# Patient Record
Sex: MALE | Race: WHITE | NOT HISPANIC OR LATINO | Employment: UNEMPLOYED | URBAN - NONMETROPOLITAN AREA
[De-identification: names, ages, dates, MRNs, and addresses within clinical notes are randomized per-mention and may not be internally consistent; named-entity substitution may affect disease eponyms.]

---

## 2020-07-10 ENCOUNTER — APPOINTMENT (OUTPATIENT)
Dept: CT IMAGING | Facility: HOSPITAL | Age: 35
End: 2020-07-10
Attending: EMERGENCY MEDICINE
Payer: COMMERCIAL

## 2020-07-10 ENCOUNTER — APPOINTMENT (OUTPATIENT)
Dept: GENERAL RADIOLOGY | Facility: HOSPITAL | Age: 35
End: 2020-07-10
Attending: EMERGENCY MEDICINE
Payer: COMMERCIAL

## 2020-07-10 ENCOUNTER — HOSPITAL ENCOUNTER (EMERGENCY)
Facility: HOSPITAL | Age: 35
Discharge: LEFT BEFORE DISCHARGE | End: 2020-07-10
Attending: EMERGENCY MEDICINE | Admitting: EMERGENCY MEDICINE
Payer: COMMERCIAL

## 2020-07-10 VITALS
DIASTOLIC BLOOD PRESSURE: 86 MMHG | WEIGHT: 118.4 LBS | HEART RATE: 85 BPM | BODY MASS INDEX: 17.54 KG/M2 | HEIGHT: 69 IN | OXYGEN SATURATION: 96 % | SYSTOLIC BLOOD PRESSURE: 137 MMHG

## 2020-07-10 DIAGNOSIS — R07.89 RIGHT-SIDED CHEST WALL PAIN: ICD-10-CM

## 2020-07-10 LAB
ALBUMIN UR-MCNC: NEGATIVE MG/DL
APPEARANCE UR: CLEAR
BACTERIA #/AREA URNS HPF: ABNORMAL /HPF
BILIRUB UR QL STRIP: NEGATIVE
COLOR UR AUTO: ABNORMAL
GLUCOSE BLDC GLUCOMTR-MCNC: 103 MG/DL (ref 70–99)
GLUCOSE UR STRIP-MCNC: NEGATIVE MG/DL
HGB UR QL STRIP: NEGATIVE
KETONES UR STRIP-MCNC: NEGATIVE MG/DL
LEUKOCYTE ESTERASE UR QL STRIP: NEGATIVE
NITRATE UR QL: NEGATIVE
PH UR STRIP: 7 PH (ref 4.7–8)
RBC #/AREA URNS AUTO: <1 /HPF (ref 0–2)
SOURCE: ABNORMAL
SP GR UR STRIP: 1.01 (ref 1–1.03)
UROBILINOGEN UR STRIP-MCNC: NORMAL MG/DL (ref 0–2)
WBC #/AREA URNS AUTO: 1 /HPF (ref 0–5)

## 2020-07-10 PROCEDURE — 72125 CT NECK SPINE W/O DYE: CPT | Mod: TC

## 2020-07-10 PROCEDURE — 71045 X-RAY EXAM CHEST 1 VIEW: CPT | Mod: TC

## 2020-07-10 PROCEDURE — 70450 CT HEAD/BRAIN W/O DYE: CPT | Mod: TC

## 2020-07-10 PROCEDURE — 68300004 ZZH PARTIAL TRAUMA W/O CC LEVEL III

## 2020-07-10 PROCEDURE — 99283 EMERGENCY DEPT VISIT LOW MDM: CPT | Mod: Z6 | Performed by: EMERGENCY MEDICINE

## 2020-07-10 PROCEDURE — 81001 URINALYSIS AUTO W/SCOPE: CPT | Performed by: EMERGENCY MEDICINE

## 2020-07-10 PROCEDURE — 00000146 ZZHCL STATISTIC GLUCOSE BY METER IP

## 2020-07-10 PROCEDURE — 99284 EMERGENCY DEPT VISIT MOD MDM: CPT | Mod: 25

## 2020-07-10 PROCEDURE — 71250 CT THORAX DX C-: CPT | Mod: TC

## 2020-07-10 RX ORDER — IOPAMIDOL 612 MG/ML
100 INJECTION, SOLUTION INTRAVASCULAR ONCE
Status: DISCONTINUED | OUTPATIENT
Start: 2020-07-10 | End: 2020-07-10 | Stop reason: HOSPADM

## 2020-07-10 RX ORDER — SODIUM CHLORIDE 9 MG/ML
INJECTION, SOLUTION INTRAVENOUS CONTINUOUS
Status: DISCONTINUED | OUTPATIENT
Start: 2020-07-10 | End: 2020-07-10 | Stop reason: HOSPADM

## 2020-07-10 RX ORDER — MORPHINE SULFATE 4 MG/ML
4 INJECTION, SOLUTION INTRAMUSCULAR; INTRAVENOUS ONCE
Status: DISCONTINUED | OUTPATIENT
Start: 2020-07-10 | End: 2020-07-10 | Stop reason: HOSPADM

## 2020-07-10 RX ORDER — ONDANSETRON 2 MG/ML
4 INJECTION INTRAMUSCULAR; INTRAVENOUS ONCE
Status: DISCONTINUED | OUTPATIENT
Start: 2020-07-10 | End: 2020-07-10 | Stop reason: HOSPADM

## 2020-07-10 ASSESSMENT — ENCOUNTER SYMPTOMS
CHILLS: 0
GASTROINTESTINAL NEGATIVE: 1
MUSCULOSKELETAL NEGATIVE: 1
HEMATOLOGIC/LYMPHATIC NEGATIVE: 1
CONSTITUTIONAL NEGATIVE: 1
ENDOCRINE NEGATIVE: 1
ALLERGIC/IMMUNOLOGIC NEGATIVE: 1
MYALGIAS: 0
ABDOMINAL PAIN: 0
FEVER: 0
RESPIRATORY NEGATIVE: 1
NECK PAIN: 0
EYES NEGATIVE: 1
NECK STIFFNESS: 0
PSYCHIATRIC NEGATIVE: 1
NEUROLOGICAL NEGATIVE: 1

## 2020-07-10 ASSESSMENT — MIFFLIN-ST. JEOR: SCORE: 1462.44

## 2020-07-10 NOTE — ED NOTES
"Pt back from CT. Pt refused IV \"I will leave if you start an IV or draw labs\". \"I hate needles\".  "

## 2020-07-10 NOTE — ED AVS SNAPSHOT
[facility]  [address] [address]  Phone:  [phone]                                    Zan Mccann   MRN: [id number]    Department:  [facility]   Date of Visit:  7/10/2020           After Visit Summary Signature Page    I have received my discharge instructions, and my questions have been answered. I have discussed any challenges I see with this plan with the nurse or doctor.    ..........................................................................................................................................  Patient/Patient Representative Signature      ..........................................................................................................................................  Patient Representative Print Name and Relationship to Patient    ..................................................               ................................................  Date                                   Time    ..........................................................................................................................................  Reviewed by Signature/Title    ...................................................              ..............................................  Date                                               Time          22EPIC Rev 08/18
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
## 2020-07-10 NOTE — DISCHARGE INSTRUCTIONS
1) You may return at anytime to continue your care  2) Follow the aftercare instructions provided.

## 2020-07-10 NOTE — PROGRESS NOTES
This writer, called Zan's ex wife Betsey ([phone])to ask her to either come to visit him in the ER or come to the hospital to pick him up, per patient's request.   She did not answer, so I left a message on her cell phone.

## 2021-08-12 ENCOUNTER — HOSPITAL ENCOUNTER (EMERGENCY)
Facility: HOSPITAL | Age: 36
Discharge: HOME | End: 2021-08-12
Attending: STUDENT IN AN ORGANIZED HEALTH CARE EDUCATION/TRAINING PROGRAM | Admitting: STUDENT IN AN ORGANIZED HEALTH CARE EDUCATION/TRAINING PROGRAM
Payer: COMMERCIAL

## 2021-08-12 VITALS
HEART RATE: 60 BPM | OXYGEN SATURATION: 99 % | DIASTOLIC BLOOD PRESSURE: 72 MMHG | SYSTOLIC BLOOD PRESSURE: 110 MMHG | TEMPERATURE: 96.1 F | RESPIRATION RATE: 14 BRPM

## 2021-08-12 DIAGNOSIS — H10.9 CONJUNCTIVITIS OF RIGHT EYE, UNSPECIFIED CONJUNCTIVITIS TYPE: ICD-10-CM

## 2021-08-12 PROCEDURE — 99283 EMERGENCY DEPT VISIT LOW MDM: CPT | Performed by: STUDENT IN AN ORGANIZED HEALTH CARE EDUCATION/TRAINING PROGRAM

## 2021-08-12 PROCEDURE — 99283 EMERGENCY DEPT VISIT LOW MDM: CPT

## 2021-08-12 PROCEDURE — 250N000009 HC RX 250: Performed by: STUDENT IN AN ORGANIZED HEALTH CARE EDUCATION/TRAINING PROGRAM

## 2021-08-12 RX ORDER — PROPARACAINE HYDROCHLORIDE 5 MG/ML
1 SOLUTION/ DROPS OPHTHALMIC ONCE
Status: DISCONTINUED | OUTPATIENT
Start: 2021-08-12 | End: 2021-08-12 | Stop reason: ALTCHOICE

## 2021-08-12 RX ORDER — TETRACAINE HYDROCHLORIDE 5 MG/ML
1 SOLUTION OPHTHALMIC ONCE
Status: COMPLETED | OUTPATIENT
Start: 2021-08-12 | End: 2021-08-12

## 2021-08-12 RX ADMIN — TETRACAINE HYDROCHLORIDE 1 DROP: 5 SOLUTION OPHTHALMIC at 08:31

## 2021-08-12 NOTE — DISCHARGE INSTRUCTIONS
Please follow-up with primary care physician present physician within 1 week. Call to schedule an appointment. If you are not having improvement in her symptoms you can return to the emergency department for further evaluation. Your best not to rub the eye, it could be contagious. If you have symptoms show up in the left eye it is probably from the same infectious virus. You can use the eyedrops I sent to the [facility] in [place] to help with symptoms going forward. Develop any new concerning loss of vision, he can return to the emergency department for further evaluation. I would also recommend ibuprofen and Tylenol for pain control.

## 2021-08-12 NOTE — ED NOTES
Pt not in ED room when RN went to discharge. No phone number listed in chart, unable to contact pt. Will mail discharge instructions.

## 2021-08-12 NOTE — ED PROVIDER NOTES
History     Chief Complaint   Patient presents with     Foreign Body in Eye     right eye, woke up with sensation of something in right eye     HPI  Zan Mccann is a 35 year old male who resents to the emergency department complaining of redness in the eye and discomfort.  He states it feels like a previous corneal abrasion that he had, but is unable to identify any specific causes of corneal abrasion that he might be concerned about, he does note the possibility of tobacco or may be a piece of dust in his eye.  He states that he woke up from sleep today with it like this, denies significant purulent crusting of the area, does not know anybody also has similar symptoms.  No problems with his vision, no other complaints at this time.  Denies fevers.  No pain with extraocular muscle movement    Allergies:  Allergies   Allergen Reactions     Amoxicillin Swelling     Unsure of reaction     Pcn [Penicillins] Swelling     Penicillin G      Unsure of reaction       Problem List:    Patient Active Problem List    Diagnosis Date Noted     Excessive anger 01/08/2014     Priority: Medium     Polysubstance abuse (H) 01/08/2014     Priority: Medium     Microscopic hematuria 01/08/2014     Priority: Medium     Medical clearance for incarceration 01/08/2014     Priority: Medium        Past Medical History:    History reviewed. No pertinent past medical history.    Past Surgical History:    Past Surgical History:   Procedure Laterality Date     ORTHOPEDIC SURGERY      muscle reconstruction surgery,       Family History:    History reviewed. No pertinent family history.    Social History:  Marital Status:  Single [1]  Social History     Tobacco Use     Smoking status: Current Every Day Smoker     Packs/day: 1.00     Types: Cigarettes     Smokeless tobacco: Never Used   Substance Use Topics     Alcohol use: No     Drug use: Yes     Types: Marijuana        Medications:    ibuprofen (ADVIL,MOTRIN) 800 MG  tablet  naphazoline-pheniramine (NAPHCON-A) 0.025-0.3 % ophthalmic solution          Review of Systems  A complete review of systems was performed and is otherwise negative.     Physical Exam   BP: 110/72  Pulse: 60  Temp: (!) 96.1  F (35.6  C)  Resp: 14  SpO2: 99 %      Physical Exam  Constitutional: Alert and conversant. NAD   HENT: NCAT   Eyes: Normal pupils, significant conjunctivitis on the right eye, no significant conjunctivitis of the left eye, there is no significant crusting around the edge of the eye.  Thorough exam of the eye reveals no foreign bodies.  Fluorescein exam of the eye healed no dendritic lesions or sites of abrasion.  Overall reassuring examination.  Neck: supple   CV: Normal rate, regular rhythm, no murmur   Pulmonary/Chest: Non-labored respirations, clear to auscultation bilaterally   Abdominal: Soft, non-tender, non-distended   MSK: VIDES.   Neuro: Alert and appropriate   Skin: Warm and dry. No diaphoresis. No rashes on exposed skin    Psych: Appropriate mood and affect     ED Course     ED Course as of Aug 13 1237   Thu Aug 12, 2021   1520 Patient with redness of the eye and a feeling of foreign body, no evidence of foreign body on my exam, thorough exam was performed and the eye was fully examined without any areas and I am concerned I might not have seen.  Fluorescein exam is reassuring, no signs of corneal abrasion at this time.  A measurement of 17 and of 19 for pressures in the right eye are reassuring, no evidence of glaucoma.  Pain improved after tetracaine drops.  Most likely etiology at this time is a viral conjunctivitis.  Plan for antihistamine eyedrops on discharge, patient discharged in stable condition with all questions answered and return precautions given, recommending primary care follow-up within the next week        Procedures              No results found for this or any previous visit (from the past 24 hour(s)).    Medications   tetracaine (PONTOCAINE) 0.5 %  ophthalmic solution 1 drop (1 drop Left Eye Given 8/12/21 0831)       Assessments & Plan (with Medical Decision Making)     I have reviewed the nursing notes.    I have reviewed the findings, diagnosis, plan and need for follow up with the patient.    Discharge Medication List as of 8/12/2021  9:43 AM      START taking these medications    Details   naphazoline-pheniramine (NAPHCON-A) 0.025-0.3 % ophthalmic solution Place 1-2 drops into the right eye 4 times daily as needed for dry eyes, Disp-15 mL, R-0, E-Prescribe             Final diagnoses:   Conjunctivitis of right eye, unspecified conjunctivitis type       8/12/2021   [facility]     Eros Bonilla MD  08/12/21 1523       Eros Bonilla MD  08/13/21 1985

## 2021-09-09 ENCOUNTER — HOSPITAL ENCOUNTER (EMERGENCY)
Facility: HOSPITAL | Age: 36
Discharge: LEFT BEFORE DISCHARGE | End: 2021-09-09
Attending: INTERNAL MEDICINE | Admitting: INTERNAL MEDICINE
Payer: COMMERCIAL

## 2021-09-09 VITALS
HEART RATE: 61 BPM | RESPIRATION RATE: 20 BRPM | WEIGHT: 130 LBS | DIASTOLIC BLOOD PRESSURE: 50 MMHG | SYSTOLIC BLOOD PRESSURE: 107 MMHG | BODY MASS INDEX: 19.2 KG/M2 | TEMPERATURE: 98 F | OXYGEN SATURATION: 96 %

## 2021-09-09 DIAGNOSIS — S61.216A LACERATION OF RIGHT LITTLE FINGER WITHOUT DAMAGE TO NAIL, FOREIGN BODY PRESENCE UNSPECIFIED, INITIAL ENCOUNTER: ICD-10-CM

## 2021-09-09 PROCEDURE — 99282 EMERGENCY DEPT VISIT SF MDM: CPT | Performed by: INTERNAL MEDICINE

## 2021-09-09 PROCEDURE — 99281 EMR DPT VST MAYX REQ PHY/QHP: CPT

## 2021-09-09 ASSESSMENT — ENCOUNTER SYMPTOMS
CONFUSION: 0
DIFFICULTY URINATING: 0
HEADACHES: 0
EYE REDNESS: 0
COLOR CHANGE: 0
DIZZINESS: 1
ARTHRALGIAS: 0
NAUSEA: 1
NERVOUS/ANXIOUS: 1
NECK STIFFNESS: 0
SHORTNESS OF BREATH: 0
FEVER: 0
ABDOMINAL PAIN: 0

## 2021-09-10 NOTE — ED PROVIDER NOTES
History     Chief Complaint   Patient presents with     Laceration     Nausea & Vomiting     Dizziness     The history is provided by the patient.   Laceration  Location:  Hand  Hand laceration location:  R fingers  Depth:  Cutaneous  Quality: jagged    Bleeding: venous    Laceration mechanism:  Metal edge  Pain details:     Quality:  Aching    Severity:  Mild  Associated symptoms: no fever          Allergies:  Allergies   Allergen Reactions     Amoxicillin Swelling     Unsure of reaction     Pcn [Penicillins] Swelling     Penicillin G      Unsure of reaction       Problem List:    Patient Active Problem List    Diagnosis Date Noted     Excessive anger 01/08/2014     Priority: Medium     Polysubstance abuse (H) 01/08/2014     Priority: Medium     Microscopic hematuria 01/08/2014     Priority: Medium     Medical clearance for incarceration 01/08/2014     Priority: Medium        Past Medical History:    No past medical history on file.    Past Surgical History:    Past Surgical History:   Procedure Laterality Date     ORTHOPEDIC SURGERY      muscle reconstruction surgery,       Family History:    No family history on file.    Social History:  Marital Status:  Single [1]  Social History     Tobacco Use     Smoking status: Current Every Day Smoker     Packs/day: 1.00     Types: Cigarettes     Smokeless tobacco: Never Used   Substance Use Topics     Alcohol use: No     Drug use: Yes     Types: Marijuana        Medications:    ibuprofen (ADVIL,MOTRIN) 800 MG tablet  naphazoline-pheniramine (NAPHCON-A) 0.025-0.3 % ophthalmic solution          Review of Systems   Constitutional: Negative for fever.   HENT: Negative for congestion.    Eyes: Negative for redness.   Respiratory: Negative for shortness of breath.    Cardiovascular: Negative for chest pain.   Gastrointestinal: Positive for nausea. Negative for abdominal pain.   Genitourinary: Negative for difficulty urinating.   Musculoskeletal: Negative for arthralgias and  "neck stiffness.   Skin: Negative for color change.   Neurological: Positive for dizziness. Negative for headaches.   Psychiatric/Behavioral: Negative for confusion. The patient is nervous/anxious.        Physical Exam   BP: 107/50  Pulse: 61  Temp: 98  F (36.7  C)  Resp: 20  Weight: 59 kg (130 lb)  SpO2: 96 %      Physical Exam  Constitutional:       General: He is not in acute distress.     Appearance: He is not diaphoretic.   HENT:      Head: Atraumatic.   Eyes:      General: No scleral icterus.     Pupils: Pupils are equal, round, and reactive to light.   Cardiovascular:      Heart sounds: Normal heart sounds.   Pulmonary:      Effort: No respiratory distress.      Breath sounds: Normal breath sounds.   Abdominal:      General: Bowel sounds are normal.      Palpations: Abdomen is soft.      Tenderness: There is no abdominal tenderness.   Musculoskeletal:         General: No tenderness.        Hands:       Comments: Semi circular laceration about 1 cm on proximal back of 5 th finger, oozing blood   Skin:     General: Skin is warm.      Findings: No rash.         ED Course        Procedures                No results found for this or any previous visit (from the past 24 hour(s)).    Medications - No data to display    Assessments & Plan (with Medical Decision Making)   Right 5th finger. Bleeding controlled after pressure dressing   Pt refused any suturing and further evaluation , \" I just wanted bleeding to stop:\"  Walked out of ER     I have reviewed the nursing notes.    I have reviewed the findings, diagnosis, plan and need for follow up with the patient.      Discharge Medication List as of 9/9/2021  8:39 PM          Final diagnoses:   Laceration of right little finger without damage to nail, foreign body presence unspecified, initial encounter       9/9/2021   [facility]     Jorge Morales MD  09/15/21 2002    "

## 2021-09-10 NOTE — ED NOTES
"Pt declining IV insertion.  Pt presents to ED with c/o \"cutting my knuckle off\" on his right pinky.  Pt's had is wrapped and bleeding appears to be controlled.  Pt states he was only nauseated because he saw the blood.   "

## 2022-10-10 NOTE — ED TRIAGE NOTES
"Patient states that he woke up from sleep with the sensation \"that something was in my eye\". Patient unsure of what it could be and states \"my eye is bloodshot\". Patient arrives with bandana covering eyes. Patient ambulatory to ED room 11 with girlfriend. Patient a/o x4 and ABCs intact.   " No

## 2023-09-28 ENCOUNTER — HOSPITAL ENCOUNTER (EMERGENCY)
Facility: HOSPITAL | Age: 38
Discharge: HOME | End: 2023-09-28
Attending: PHYSICIAN ASSISTANT | Admitting: PHYSICIAN ASSISTANT
Payer: COMMERCIAL

## 2023-09-28 VITALS
BODY MASS INDEX: 18.51 KG/M2 | HEART RATE: 68 BPM | TEMPERATURE: 98.1 F | WEIGHT: 125 LBS | HEIGHT: 69 IN | RESPIRATION RATE: 20 BRPM | SYSTOLIC BLOOD PRESSURE: 111 MMHG | OXYGEN SATURATION: 94 % | DIASTOLIC BLOOD PRESSURE: 64 MMHG

## 2023-09-28 DIAGNOSIS — S02.2XXA CLOSED FRACTURE OF NASAL BONE, INITIAL ENCOUNTER: ICD-10-CM

## 2023-09-28 PROCEDURE — 99283 EMERGENCY DEPT VISIT LOW MDM: CPT

## 2023-09-28 PROCEDURE — 99282 EMERGENCY DEPT VISIT SF MDM: CPT | Performed by: PHYSICIAN ASSISTANT

## 2023-09-28 PROCEDURE — 250N000013 HC RX MED GY IP 250 OP 250 PS 637: Performed by: PHYSICIAN ASSISTANT

## 2023-09-28 PROCEDURE — 250N000011 HC RX IP 250 OP 636: Performed by: PHYSICIAN ASSISTANT

## 2023-09-28 RX ORDER — IBUPROFEN 200 MG
400 TABLET ORAL ONCE
Status: COMPLETED | OUTPATIENT
Start: 2023-09-28 | End: 2023-09-28

## 2023-09-28 RX ORDER — ONDANSETRON 4 MG/1
4 TABLET, ORALLY DISINTEGRATING ORAL ONCE
Status: COMPLETED | OUTPATIENT
Start: 2023-09-28 | End: 2023-09-28

## 2023-09-28 RX ORDER — HYDROCODONE BITARTRATE AND ACETAMINOPHEN 5; 325 MG/1; MG/1
1 TABLET ORAL ONCE
Status: COMPLETED | OUTPATIENT
Start: 2023-09-28 | End: 2023-09-28

## 2023-09-28 RX ADMIN — HYDROCODONE BITARTRATE AND ACETAMINOPHEN 1 TABLET: 5; 325 TABLET ORAL at 21:33

## 2023-09-28 RX ADMIN — ONDANSETRON 4 MG: 4 TABLET, ORALLY DISINTEGRATING ORAL at 20:57

## 2023-09-28 RX ADMIN — IBUPROFEN 400 MG: 200 TABLET, FILM COATED ORAL at 21:33

## 2023-09-28 ASSESSMENT — ENCOUNTER SYMPTOMS
PHOTOPHOBIA: 0
DIZZINESS: 0
ABDOMINAL PAIN: 0
HEADACHES: 0
SHORTNESS OF BREATH: 0
NECK PAIN: 0
FEVER: 0
NECK STIFFNESS: 0
VOMITING: 0
BACK PAIN: 0
BRUISES/BLEEDS EASILY: 0
COUGH: 0

## 2023-09-28 ASSESSMENT — ACTIVITIES OF DAILY LIVING (ADL): ADLS_ACUITY_SCORE: 35

## 2023-09-29 NOTE — DISCHARGE INSTRUCTIONS
Return here as needed.    Ear nose and throat will typically fix the fracture in the next several weeks or months depending on cosmetic result etc.
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
## 2023-10-11 NOTE — PATIENT INSTRUCTIONS
Use Flonase 2 sprays to each nostril once everyday.   Complete CT facial bones, someone from scheduling will call to make this appointment with you.    Thank you for allowing Dr. Osuna and our ENT team to participate in your care.  If your medications are too expensive, please give the nurse a call.  We can possibly change this medication.  If you have a scheduling or an appointment question please contact our Health Unit Coordinator at their direct line [phone].   ALL nursing questions or concerns can be directed to your ENT nurse, Justo, at: [phone]
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
## 2023-10-13 ENCOUNTER — OFFICE VISIT (OUTPATIENT)
Dept: OTOLARYNGOLOGY | Facility: OTHER | Age: 38
End: 2023-10-13
Attending: OTOLARYNGOLOGY
Payer: COMMERCIAL

## 2023-10-13 ENCOUNTER — TELEPHONE (OUTPATIENT)
Dept: OTOLARYNGOLOGY | Facility: OTHER | Age: 38
End: 2023-10-13

## 2023-10-13 VITALS
SYSTOLIC BLOOD PRESSURE: 110 MMHG | BODY MASS INDEX: 18.51 KG/M2 | OXYGEN SATURATION: 99 % | TEMPERATURE: 97.3 F | HEIGHT: 69 IN | WEIGHT: 125 LBS | DIASTOLIC BLOOD PRESSURE: 64 MMHG | HEART RATE: 74 BPM

## 2023-10-13 DIAGNOSIS — S09.92XD INJURY OF NOSE, SUBSEQUENT ENCOUNTER: ICD-10-CM

## 2023-10-13 DIAGNOSIS — J34.3 NASAL TURBINATE HYPERTROPHY: Primary | ICD-10-CM

## 2023-10-13 DIAGNOSIS — Z72.0 TOBACCO ABUSE: ICD-10-CM

## 2023-10-13 DIAGNOSIS — J00 ACUTE RHINITIS: ICD-10-CM

## 2023-10-13 PROCEDURE — 31231 NASAL ENDOSCOPY DX: CPT | Performed by: OTOLARYNGOLOGY

## 2023-10-13 PROCEDURE — 99204 OFFICE O/P NEW MOD 45 MIN: CPT | Mod: 25 | Performed by: OTOLARYNGOLOGY

## 2023-10-13 PROCEDURE — G0463 HOSPITAL OUTPT CLINIC VISIT: HCPCS | Mod: 25

## 2023-10-13 RX ORDER — FLUTICASONE PROPIONATE 50 MCG
2 SPRAY, SUSPENSION (ML) NASAL DAILY
Qty: 16 G | Refills: 12 | Status: SHIPPED | OUTPATIENT
Start: 2023-10-13

## 2023-10-13 ASSESSMENT — PAIN SCALES - GENERAL: PAINLEVEL: NO PAIN (0)
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
## 2023-10-17 ENCOUNTER — HOSPITAL ENCOUNTER (OUTPATIENT)
Dept: CT IMAGING | Facility: HOSPITAL | Age: 38
Discharge: HOME | End: 2023-10-17
Attending: OTOLARYNGOLOGY | Admitting: OTOLARYNGOLOGY
Payer: COMMERCIAL

## 2023-10-17 DIAGNOSIS — S02.2XXA NASAL FRACTURE: ICD-10-CM

## 2023-10-17 PROCEDURE — 70486 CT MAXILLOFACIAL W/O DYE: CPT
